# Patient Record
Sex: FEMALE | Race: WHITE | URBAN - METROPOLITAN AREA
[De-identification: names, ages, dates, MRNs, and addresses within clinical notes are randomized per-mention and may not be internally consistent; named-entity substitution may affect disease eponyms.]

---

## 2020-06-08 ENCOUNTER — OFFICE VISIT CONVERTED (OUTPATIENT)
Dept: CARDIOLOGY | Facility: CLINIC | Age: 30
End: 2020-06-08
Attending: INTERNAL MEDICINE

## 2020-06-08 ENCOUNTER — CONVERSION ENCOUNTER (OUTPATIENT)
Dept: CARDIOLOGY | Facility: CLINIC | Age: 30
End: 2020-06-08

## 2020-06-14 LAB — SARS-COV-2 RNA SPEC QL NAA+PROBE: NOT DETECTED

## 2020-06-16 ENCOUNTER — HOSPITAL ENCOUNTER (OUTPATIENT)
Dept: PERIOP | Facility: HOSPITAL | Age: 30
Setting detail: HOSPITAL OUTPATIENT SURGERY
Discharge: HOME | End: 2020-06-16
Attending: OBSTETRICS & GYNECOLOGY

## 2020-06-16 LAB
GLUCOSE BLD-MCNC: 70 MG/DL (ref 65–99)
HCG UR QL: NEGATIVE

## 2020-06-22 ENCOUNTER — CONVERSION ENCOUNTER (OUTPATIENT)
Dept: CARDIOLOGY | Facility: CLINIC | Age: 30
End: 2020-06-22
Attending: INTERNAL MEDICINE

## 2020-08-13 ENCOUNTER — CONVERSION ENCOUNTER (OUTPATIENT)
Dept: NEUROLOGY | Facility: CLINIC | Age: 30
End: 2020-08-13

## 2020-08-13 ENCOUNTER — OFFICE VISIT CONVERTED (OUTPATIENT)
Dept: NEUROLOGY | Facility: CLINIC | Age: 30
End: 2020-08-13
Attending: PSYCHIATRY & NEUROLOGY

## 2020-09-25 ENCOUNTER — HOSPITAL ENCOUNTER (OUTPATIENT)
Dept: MRI IMAGING | Facility: HOSPITAL | Age: 30
Discharge: HOME | End: 2020-09-25
Attending: PSYCHIATRY & NEUROLOGY

## 2020-10-13 ENCOUNTER — OFFICE VISIT CONVERTED (OUTPATIENT)
Dept: NEUROLOGY | Facility: CLINIC | Age: 30
End: 2020-10-13
Attending: PSYCHIATRY & NEUROLOGY

## 2020-11-25 ENCOUNTER — TELEMEDICINE CONVERTED (OUTPATIENT)
Dept: NEUROLOGY | Facility: CLINIC | Age: 30
End: 2020-11-25
Attending: PSYCHIATRY & NEUROLOGY

## 2021-05-10 NOTE — PROCEDURES
"   Procedure Note      Patient Name: Vicky Foster   Patient ID: [id number]   Sex: Female   YOB: 1990        Visit Date: June 22, 2020    Provider: Rajan Foster MD   Location: [facility]   Location Address: [address] [place] [address], [facility] [[email or web address]] A   [place]  [id number]   Location Phone: [phone]          FINAL REPORT   TRANSTHORACIC ECHOCARDIOGRAM REPORT    Diagnosis: Palpitations   Height: 5'2\" Weight: 138 B/P: 114/81 BSA: 1.6   Tech: BNS   MEASUREMENTS:  RVID (Diastole) : RVID. (NORMAL: 0.7 to 2.4 cm max)   LVID (Systole): 2.7 cm (Diastole): 4.5 cm . (NORMAL: 3.7 - 5.4 cm)   Posterior Wall Thickness (Diastole): 0.7 cm. (NORMAL: 0.8 - 1.1 cm)   Septal Thickness (Diastole): 0.8 cm. (NORMAL: 0.7 - 1.2 cm)   LAID (Systole): 2.7 cm. (NORMAL: 1.9 - 3.8 cm)   Aortic Root Diameter (Diastole): 2.5 cm. (NORMAL: 2.0 - 3.7 cm)   COMMENTS:  The patient underwent 2-D, M-Mode, and Doppler examination, including pulse-wave, continuous-wave, and color-flow Doppler analysis; the study is technically adequate. The following findings were noted:   FINDINGS:  MITRAL VALVE: The mitral valve leaflets appear normal. No evidence of mitral valve prolapse. No mitral stenosis. Trace mitral regurgitation.   AORTIC VALVE: Trileaflet aortic valve with no evidence of thickening. No aortic stenosis. No aortic regurgitation.   TRICUSPID VALVE: Normal valve morphology and excursion. Mild tricuspid regurgitation. Estimated right ventricular systolic pressure is 20 to 25 mmHg.   PULMONIC VALVE: Grossly normal. No evidence of pulmonic stenosis or pulmonic regurgitation by Doppler examination.   AORTIC ROOT: Normal size with adequate motion.   LEFT ATRIUM: Normal left atrial size.   LEFT VENTRICLE: Normal left ventricular size. Normal left ventricular wall thickness. No left ventricular thrombus or mas visualized. Normal left ventricular systolic function with an estimated ejection fraction of 60 to 65% and " normal left ventricular wall motion throughout. Normal diastolic filling pattern. Tissue Doppler findings were consistent with normal left ventricular filling pressure.   RIGHT VENTRICLE: Normal right ventricular size and systolic function.   RIGHT ATRIUM: Normal right atrial size.   PERICARDIUM: No pericardial effusion.   INFERIOR VENA CAVA: Normal IVC size and respirophasic changes.   DOPPLER: E/A ratio is 1.1.DT= 280 msec. IVRT is 72 msec. E/E' is 6.   Faxed: 06/29/2020      CONCLUSION:  1.  Normal transthoracic echocardiogram.    2.  Mild tricuspid regurgitation was noted.        MD ARACELY Ervin/pap      This note was transcribed by Shanon Chavarria.  ron/aracely  The above service was transcribed by Shanon Chavarria, and I attest to the accuracy of the note.  BAP                 Electronically Signed by: Damaris Chavarria-, Other -Author on June 29, 2020 12:36:47 PM  Electronically Co-signed by: Rajan Foster MD -Reviewer on July 6, 2020 08:17:14 PM

## 2021-05-10 NOTE — H&P
History and Physical      Patient Name: Vicky Foster   Patient ID: [id number]   Sex: Female   YOB: 1990        Visit Date: June 8, 2020    Provider: Daniel Acevedo MD   Location: [facility]   Location Address: [address] [place] [address]  [id number]          History Of Present Illness  Consult requested by: Bia ORANTES   I saw Vicky Foster in the office today. This is a 29 year old, white female. She has a history of palpitations. They seemed to start in 2010 during pregnancy. Over time she has had an evaluation on a couple occasions with Holter monitoring, which shows intermittent sinus tachycardia. Previous echocardiogram in 2010 showed no significant structural abnormalities. Palpitations have been associated with dizziness, moderately intense. They occur 3 or so times a day, lasting minutes. It seems to be worse with physical activity. They seem to be worse since April when she had a medication side effect of a combination of Prozac and anti-anxiety medication. She has not had syncope. No chest pain.   PAST MEDICAL HISTORY: includes anxiety, depression; palpitations.   PSYCHOSOCIAL HISTORY: No smoking. Rare alcohol intake. Moderate caffeine intake. She is . She works as an .   FAMILY HISTORY: Positive for diabetes and hypertension.   CURRENT MEDICATIONS: include CBD oil; Zyrtec; Methocarbamol. The dosage and frequency of the medications were reviewed with the patient.   ALLERGIES: Sulfa, codeine, Hydrocodone.      PAST SURGICAL HISTORY:  [place] teeth surgery; endoscopy.       Review of Systems  · Constitutional  o Admits  o : fatigue, good general health lately, recent weight changes   · Eyes  o Denies  o : double vision  · HENT  o Denies  o : hearing loss or ringing, chronic sinus problem, swollen glands in neck  · Cardiovascular  o Admits  o : palpitations (fast, fluttering, or skipping beats), shortness of breath while  "walking or lying flat  o Denies  o : chest pain, swelling (feet, ankles, hands)  · Respiratory  o Denies  o : chronic or frequent cough, asthma or wheezing, COPD  · Gastrointestinal  o Admits  o : nausea  o Denies  o : ulcers, vomiting  · Neurologic  o Admits  o : dizziness, headaches.  o Denies  o : lightheaded, stroke  · Musculoskeletal  o Admits  o : joint pain, back pain  · Endocrine  o Admits  o : diabetes  o Denies  o : thyroid disease, heat or cold intolerance, excessive thirst or urination  · Heme-Lymph  o Denies  o : bleeding or bruising tendency, anemia      Vitals  Date Time BP Position Site L\R Cuff Size HR RR TEMP (F) WT  HT  BMI kg/m2 BSA m2 O2 Sat HC       06/08/2020 03:01 /81 Sitting    75 - R  99.5 138lbs 0oz 5'  2\" 25.24 1.65           Physical Examination  · Constitutional  o Appearance  o : This is a normal, white female, pleasant, in no acute distress.  · Head and Face  o HEENT  o : No pallor, anicteric. Eyes normal. Moist mucous membranes.  · Neck  o Inspection/Palpation  o : Supple. No hepatosplenomegaly.  o Jugular Veins  o : No JVD. No carotid bruits.  · Respiratory  o Auscultation of Lungs  o : Clear to auscultation bilaterally. No crackles or wheezing.  · Cardiovascular  o Heart  o : S1, S2 is normally heard. No S3. No murmur, rubs, or gallops.  · Gastrointestinal  o Abdominal Examination  o : Soft, non-distended. No palpable hepatosplenomegaly. Bowel sounds heard in all four quadrants.  · Musculoskeletal  o General  o : Normal muscle tone and strength.  · Skin and Subcutaneous Tissue  o General Inspection  o : No skin rashes.  · Extremities  o Extremities  o : Warm and well perfused. Distal pulses present. No pitting pedal edema.     I personally reviewed her EKG tracing from April 28th that showed sinus tachycardia, rate 101, normal intervals, no ST changes.  Compared to previous tracing from 2019, there is no significant interval change.    Her laboratory studies show normal TSH, " normal CBC, normal chemistry.    Previous Holter monitoring shows intermittent sinus tachycardia.               Assessment     Palpitations - The patient seems to be having symptoms from intermittent sinus tachycardia.  She does not  have incessant sinus tachycardia.  There is no sign of anemia, electrolyte imbalance or thyroid derangement.  Previous echocardiogram in 2010 showed no significant structural abnormalities.         Plan     The patient is symptomatic from sinus tachycardia - At this time I would like to avoid placing her on medication  for this since this is a benign condition.  An echocardiogram will be scheduled to rule out any structural abnormalities.  I think, from a lifestyle modification standpoint, if she kept herself very well-hydrated and increased cardiovascular exercise, this will likely improve her tendency to get sinus tachycardia.  If these measures are ineffective over a few months, we can certainly try beta blocker therapy to improve her symptoms, although it is possible, especially in young patients, that side effects can be worse than the benefit.  She is agreeable with this plan.  She will call if she continues to have significant problems.  We will call once I review her echocardiogram.    Please let me know if you have any questions regarding her case.    Sincerely,        ANNA bob/adriana           This note was transcribed by Cammie Quintero.  dmd/cbd  The above service was transcribed by Cammie Quintero, and I attest to the accuracy of the note.  CBD.             Electronically Signed by: Cammie Quintero-, -Author on June 12, 2020 05:27:04 AM  Electronically Co-signed by: Daniel Acevedo MD -Reviewer on June 12, 2020 09:33:50 AM

## 2021-05-10 NOTE — H&P
History and Physical      Patient Name: Vicky Foster   Patient ID: [id number]   Sex: Female   YOB: 1990        Visit Date: August 13, 2020    Provider: Sampson Mcduffie MD   Location: [facility]   Location Address: [address] [place] [address]  [id number]   Location Phone: [phone]          Chief Complaint     New pt here for dizziness and headache.       History Of Present Illness  Vicky Foster is a 29 year old female who presents today to [facility] today referred from REFERRING CARE PROVIDER NAME.      29-year-old woman evaluated for dizziness as well as other complaints.  She states that this is been going on since 2015 and it is getting worse.  She states that it got worse in January and is been there all the time.  She states that her mother has chronic migraines and cluster headaches and wanted to be checked out for this.  She states that she is dizzy almost all the time.  Sometimes it gets worse especially the end of the day.  Her balance is off and her depth perception is off.  She states that she gets wobbly at that time and her vision gets blurry.  She gets daily pressure behind her eyes and sinuses on a daily basis but pain 3 times a week behind her eyes with associated light and sound sensitivity.  She takes ibuprofen it usually gets better.  This pain is usually worse when she wakes up in the morning.  She has a hard time concentrating at work.  She also has problems with insomnia.  She cannot fall asleep.  There is thoughts in her head.  She states that she does not get a restful sleep and she feels tired in the morning.  She tosses and turns.  She wants to sleep and during the daytime she gets sleepy.    She gets a migraine headache at least twice a year associated light no sensitivity, nausea.  She gets nauseated with this headaches at this time intermittently.  She has not had an MRI of her brain.  She has not had a sleep study.  She  states that she has had laboratory work-up to work-up for some endocrine abnormalities.    She states that she has had mood disorder, depression and anxiety since she was younger.  She was seeing psychologist and she will see the psychiatrist for medications at [facility] but she is no longer taking medications.    She has 2 children with sensory disorder.  She states that she is stressed out about many things.       Past Medical History  Arthritis; Dizziness; hypoglycemia; Migraine         Past Surgical History  Tubal ligation         Medication List  ibuprofen 800 mg oral tablet         Allergy List  Codeine Phosphate; SULFA (SULFONAMIDES); Tape         Family Medical History  Arthrtis         Social History  Alcohol (Light); Tobacco (Never)         Review of Systems  · Constitutional  o Denies  o : chills, excessive sweating, fatigue, fever, sycope/passing out, weight gain, weight loss  · Eyes  o Denies  o : changes in vision, blurry vision, double vision  · HENT  o Denies  o : loss of hearing, ringing in the ears, ear aches, sore throat, nasal congestion, sinus pain, nose bleeds, seasonal allergies  · Cardiovascular  o Denies  o : blood clots, swollen legs, anemia, easy burising or bleeding, transfusions  · Respiratory  o Denies  o : shortness of breath, dry cough, productive cough, pneumonia, COPD  · Gastrointestinal  o Denies  o : difficulty swallowing, reflux  · Genitourinary  o Denies  o : incontinence  · Neurologic  o Denies  o : headache, seizure, stroke, tremor, loss of balance, falls, dizziness/vertigo, difficulty with sleep, numbness/tingling/paresthesia , difficulty with coordination, difficulty with dexterity, weakness  · Musculoskeletal  o Denies  o : neck stiffness/pain, swollen lymph nodes, muscle aches, joint pain, weakness, spasms, sciatica, pain radiating in arm, pain radiating in leg, low back pain  · Endocrine  o Denies  o : diabetes, thyroid disorder  · Psychiatric  o Denies  o : anxiety,  "depression      Vitals  Date Time BP Position Site L\R Cuff Size HR RR TEMP (F) WT  HT  BMI kg/m2 BSA m2 O2 Sat HC       08/13/2020 03:16 /72 Sitting    97 - R   140lbs 0oz 5'  2\" 25.61 1.67           Physical Examination  · Constitutional  o Appearance  o : well-nourished, well groomed, in no apparent distress  · Eyes  o Pupils and Irises  o : pupils equal, round, and reactive to light and accommodation bilaterally  · Respiratory  o Auscultation of Lungs  o : Lungs were clear to ascultation bilaterally.   · Cardiovascular  o Heart  o :   § Auscultation of Heart  § : regular rhythem and normal rate.  o Peripheral Vascular System  o :   § Carotid Arteries  § : carotids are clear bilaterally  § Extremities  § : no peripheral edema was appreciated  · Musculoskeletal  o General  o : normal bulk and normal tone throughout. 5/5 motor strength throughout and symmetric.   · Neurologic  o Mental Status Examination  o :   § Orientation  § : Alert and oriented to person, place, and time.  § Speech/Language  § : Intact naming, comprehension, and repetition. No dysarthria.  § Memory  § : Intact  § Attention  § : Intact  § Fund of Knowledge  § : Adequate fund of knowledge.  § Mental Status Examination  § : Mental Status Score:   o Cranial Nerves  o : Pupils are equal, round and reactive to light. Extraocular movements are intact. Visual fields are full. Fundoscopic examination reveals sharp disc bilaterally. Sensation in the V1-V3 distribution is intact and symmetric. Muscles of mastication are strong and symmetric. Muscles of facial expression are strong and symmetric. Hearing is intact. Palatal raise is intact and symmetric. Uvula is midline. Shoulder shrug is strong. Tongue protrudes in the midline.  o Motor Examination  o : Normal bulk. 5/5 motor strength throughout and symmetric.   o Reflexes  o : 2+ reflexes throughout and symmetric.   o Sensation  o : Intact sensation to light touch, symmetrical  o Gait and " Station  o : Normal gait, arm swing and turning. Able to tiptoe, heel walk and akash and tandem without difficulty.  o Cerebellar Function  o : Intact finger to nose, rapid alternating movements and fine finger movements.          Assessment  · Anxiety     300.00/F41.9  I discussed with her if the work-up is negative for sleep disorder as well as negative MRI of the brain I would like for her to be referred [facility] to see Dr.Rifaat Landeros who specializes in mood disorder. I would also like for her to be referred to endocrinology to complete the work-up.    I will see her again in 2 months time for follow-up. 60 minutes was spent for this high complexity office visit more than half time was spent face-to-face with the patient for examination, counseling, planning and recommendations.  · Excessive daytime sleepiness     780.54/G47.19  I will refer her for sleep consultation with Dr. Tatyana Garg to see if her fragmented sleep is secondary to an organic etiology. She may need a sleep study.  · Chronic headaches     784.0/R51  Her chronic headaches are likely related to her sleep disorder. She does not sleep well at night. I will do an MRI of the brain and she is to call our office to find out the results.  · Dizziness     780.4/R42  I told her that the dizziness is likely secondary to lack of sleep as well. Hopefully we can treat the sleep disorder and her symptoms will improve.  · Insomnia     780.52/G47.00    Problems Reconciled  Plan  · Orders  o Sleep Disorder Clinic Consultation (SLEEP) - 780.54/G47.19, 780.52/G47.00, 784.0/R51, 780.4/R42 - 08/13/2020  o MRI brain wo contrast (92030) - 780.54/G47.19, 784.0/R51, 780.4/R42, 780.52/G47.00 - 08/13/2020  · Medications  o Medications have been Reconciled  o Transition of Care or Provider Policy  · Instructions  o Encouraged to follow-up with Primary Care Provider for preventative care.  o Follow up in 2 months.            Electronically Signed by:  Sampson Mcduffie MD -Author on August 13, 2020 03:55:16 PM

## 2021-05-13 NOTE — PROGRESS NOTES
Progress Note      Patient Name: Vicky Foster   Patient ID: [id number]   Sex: Female   YOB: 1990    Primary Care Provider: Bia ORANTES   Referring Provider: Bia ORANTES    Visit Date: October 13, 2020    Provider: Sampson Mcduffie MD   Location: [facility]   Location Address: [address] [place] [address]  [id number]   Location Phone: [phone]          Chief Complaint     F/u for dizziness/ h/a.       History Of Present Illness  Vicky Foster is a 30 year old female who presents today to [facility] today referred from Bia ORANTES.      30-year-old woman here for follow-up for chronic headache.  She states that she is getting 8 moderate to severe headaches a month.  They can last the entire day if she does not take ibuprofen.  The headaches are usually behind her eyes, stabbing associated nausea light and noise sensitivity.  It is also throbbing.  This is similar to headaches that she has before when she was describing to me migraines.  It became worse in the last 2 months.  She states that she takes ibuprofen at least 4 times a week.  The headaches can last anywhere from a few hours to 1 to 3 days.  She is not taking topiramate in the past.  She is had a tubal ligation.  She does not have a history of kidney stones.    In regards to her anxiety she is being followed by psychology at [place] and they will not allow her to go out of pose.  She states that her anxiety is getting better.  She has not gotten the sleep study that I requested for her to do.  She has excessive daytime somnolence and insomnia.       Past Medical History  Arthritis; Dizziness; hypoglycemia; Migraine         Past Surgical History  Tubal ligation         Medication List  Flonase Allergy Relief 50 mcg/actuation nasal spray,suspension; ibuprofen 800 mg oral tablet; Zyrtec 10 mg oral capsule         Allergy List  Codeine Phosphate; SULFA (SULFONAMIDES);  "Tape         Family Medical History  Arthrtis         Social History  Alcohol (Light); Tobacco (Never)         Review of Systems  · Constitutional  o Admits  o : fatigue, weight gain  o Denies  o : chills, excessive sweating, fever, sycope/passing out, weight loss  · Eyes  o Admits  o : blurry vision  o Denies  o : changes in vision, double vision  · HENT  o Admits  o : sore throat, nasal congestion, sinus pain, seasonal allergies  o Denies  o : loss of hearing, ringing in the ears, ear aches, nose bleeds  · Cardiovascular  o Denies  o : blood clots, swollen legs, anemia, easy burising or bleeding, transfusions  · Respiratory  o Admits  o : productive cough  o Denies  o : shortness of breath, dry cough, pneumonia, COPD  · Gastrointestinal  o Denies  o : difficulty swallowing, reflux  · Genitourinary  o Denies  o : incontinence  · Neurologic  o Admits  o : headache, dizziness/vertigo, difficulty with sleep, weakness  o Denies  o : seizure, stroke, tremor, loss of balance, falls, numbness/tingling/paresthesia , difficulty with coordination, difficulty with dexterity  · Musculoskeletal  o Admits  o : joint pain, low back pain  o Denies  o : neck stiffness/pain, swollen lymph nodes, muscle aches, weakness, spasms, sciatica, pain radiating in arm, pain radiating in leg  · Endocrine  o Denies  o : diabetes, thyroid disorder  · Psychiatric  o Admits  o : anxiety, depression      Vitals  Date Time BP Position Site L\R Cuff Size HR RR TEMP (F) WT  HT  BMI kg/m2 BSA m2 O2 Sat FR L/min FiO2 HC       10/13/2020 03:30 PM        97.3           10/13/2020 03:53 /82 Sitting    99 - R   143lbs 0oz 5'  6\" 23.08 1.74             Physical Examination     She is alert, fluent, phasic, follows commands well.  Optic disks are normal bilaterally, visual fields are full, EOMs full directions gaze.  There is no focal weakness of the upper or lower extremities.  Station and gait is unremarkable.  Heart is regular in rhythm normal in " rate           Assessment  · Chronic migraine without aura     346.70/G43.709  She has chronic migraine without aura. I told her to stop taking ibuprofen since this may cause medication overuse headache and it also is not good for her kidneys and causes a risk factor for cardiovascular and cerebrovascular disease. I will start her on topiramate at 25 mg initially and increase the dose by 25 mg every week in 2 divided doses until she is taken up to 50 mg twice a day. I explained to her the adverse effects of the medication including paresthesias, abnormal taste, rarely kidney stones, metabolic acidosis weight loss, mood swings, depression. She is to call her office for any problems. For abortive treatment I will start her on sumatriptan 50 mg at onset of headache and repeated again in 2 hours as needed maximum 2/day or 8/month. I explained her to her the adverse effects. 25 minutes was spent for this moderate complexity encounter more than half the time was spent face-to-face with the patient for examination, counseling, planning recommendations.  · Excessive daytime sleepiness     780.54/G47.19  She is to follow-up with the sleep lab for her excessive daytime somnolence and insomnia.      Plan  · Medications  o sumatriptan succinate 50 mg oral tablet   SI at onset of headache and repeat again in 2 hours as needed maximum 2/day or 8/month.   DISP: (9) Tablet with 6 refills  Prescribed on 10/13/2020     o topiramate 25 mg oral tablet   SI QD X 1 wk, 1 BID 2nd wk, 1 Q AM and 2 Q PM 3rd wk and 2 BID 4th wk and thereafter.   DISP: (120) Tablet with 6 refills  Prescribed on 10/13/2020     o Medications have been Reconciled  o Transition of Care or Provider Policy  · Instructions  o Encouraged to follow-up with Primary Care Provider for preventative care.  · Disposition  o Follow Up 2 months.  · Referrals  o ID: [id number] Date: 10/09/2020 Type: Inbound  Specialty: Neurology            Electronically Signed by: Sampson  ROSA Mcduffie MD -Author on October 13, 2020 04:12:53 PM

## 2021-05-13 NOTE — PROGRESS NOTES
Progress Note      Patient Name: Vicky Foster   Patient ID: [id number]   Sex: Female   YOB: 1990    Primary Care Provider: Bia ORANTES   Referring Provider: Bia ORANTES    Visit Date: November 25, 2020    Provider: Sampson Mcduffie MD   Location: [facility]   Location Address: [address] [place] [address]  [id number]   Location Phone: [phone]          Chief Complaint     F/u via Zoom for dizziness/ h/a's.       History Of Present Illness  Vicky Foster is a 30 year old female who presents today to [facility] today referred from Bia ORANTES.      30-year-old woman follow-up video telehealth visit for headaches.  She states her headaches occurring 4 times a month.  They can become severe.  Sumatriptan does not work for her.  She states that she took topiramate if 1 in the morning and 2 at night and she had adverse effects including tingling of her hands and feet, and cognitive changes as well as loss of taste.  She stopped taking it.  She does not want to try another preventive medication at this time.  She wants to try another abortive treatment.  Is not having daily headaches.  She is only getting 4 headaches a month.       Past Medical History  Arthritis; Dizziness; hypoglycemia; Migraine         Past Surgical History  Tubal ligation         Medication List  Flonase Allergy Relief 50 mcg/actuation nasal spray,suspension; ibuprofen 800 mg oral tablet; Singulair 10 mg oral tablet; Zyrtec 10 mg oral capsule         Allergy List  Codeine Phosphate; SULFA (SULFONAMIDES); Tape         Family Medical History  Arthrtis         Social History  Alcohol (Light); Tobacco (Never)         Review of Systems  · Constitutional  o Denies  o : chills, excessive sweating, fatigue, fever, sycope/passing out, weight gain, weight loss  · Eyes  o Denies  o : changes in vision, blurry vision, double vision  · HENT  o Denies  o : loss of hearing,  ringing in the ears, ear aches, sore throat, nasal congestion, sinus pain, nose bleeds, seasonal allergies  · Cardiovascular  o Denies  o : blood clots, swollen legs, anemia, easy burising or bleeding, transfusions  · Respiratory  o Denies  o : shortness of breath, dry cough, productive cough, pneumonia, COPD  · Gastrointestinal  o Denies  o : difficulty swallowing, reflux  · Genitourinary  o Denies  o : incontinence  · Neurologic  o Admits  o : headache, dizziness/vertigo  o Denies  o : seizure, stroke, tremor, loss of balance, falls, difficulty with sleep, numbness/tingling/paresthesia , difficulty with coordination, difficulty with dexterity, weakness  · Musculoskeletal  o Denies  o : neck stiffness/pain, swollen lymph nodes, muscle aches, joint pain, weakness, spasms, sciatica, pain radiating in arm, pain radiating in leg, low back pain  · Endocrine  o Denies  o : diabetes, thyroid disorder  · Psychiatric  o Denies  o : anxiety, depression      Physical Examination     She is alert, fluent, phasic, follows commands well.           Assessment  · Migraine headache without aura     346.10/G43.009  I will try her on Maxalt MLT 1 at onset of headache and repeat again in 2 hours as needed maximum of 3/day or 12/month. Explained to her that is similar to sumatriptan. She does not want to take preventive medications at this time. In the future I will try her on Nurtec. She is to call our office in the next 1 to 2 months to give us a progress report and make a follow-up visit thereafter.    15 minutes was spent for this low complexity visit more than half the time was spent face-to-face with the patient for examination, counseling, planning and recommendations.      Plan  · Medications  o Maxalt-MLT 10 mg oral tablet,disintegrating   SI at the onset of headache repeat again in 2 hours as needed maximum 3/day or 12/month   DISP: (12) Tablet with 5 refills  Prescribed on 2020     o Medications have been  Reconciled  o Transition of Care or Provider Policy  · Instructions  o Encouraged to follow-up with Primary Care Provider for preventative care.  · Referrals  o ID: [id number] Date: 10/09/2020 Type: Inbound  Specialty: Neurology            Electronically Signed by: Sampson Mcduffie MD -Author on November 25, 2020 03:57:13 PM

## 2021-05-14 VITALS
SYSTOLIC BLOOD PRESSURE: 115 MMHG | DIASTOLIC BLOOD PRESSURE: 82 MMHG | BODY MASS INDEX: 22.98 KG/M2 | HEIGHT: 66 IN | TEMPERATURE: 97.3 F | HEART RATE: 99 BPM | WEIGHT: 143 LBS

## 2021-05-15 VITALS
DIASTOLIC BLOOD PRESSURE: 72 MMHG | HEART RATE: 97 BPM | SYSTOLIC BLOOD PRESSURE: 116 MMHG | WEIGHT: 140 LBS | BODY MASS INDEX: 25.76 KG/M2 | HEIGHT: 62 IN

## 2021-05-15 VITALS
SYSTOLIC BLOOD PRESSURE: 114 MMHG | TEMPERATURE: 99.5 F | HEIGHT: 62 IN | BODY MASS INDEX: 25.4 KG/M2 | WEIGHT: 138 LBS | DIASTOLIC BLOOD PRESSURE: 81 MMHG | HEART RATE: 75 BPM